# Patient Record
Sex: MALE | Race: WHITE | URBAN - METROPOLITAN AREA
[De-identification: names, ages, dates, MRNs, and addresses within clinical notes are randomized per-mention and may not be internally consistent; named-entity substitution may affect disease eponyms.]

---

## 2022-05-12 ENCOUNTER — EMERGENCY VISIT (OUTPATIENT)
Dept: URBAN - METROPOLITAN AREA CLINIC 1 | Facility: CLINIC | Age: 22
End: 2022-05-12

## 2022-05-12 DIAGNOSIS — S05.02XA: ICD-10-CM

## 2022-05-12 DIAGNOSIS — H20.00: ICD-10-CM

## 2022-05-12 PROCEDURE — 92012 INTRM OPH EXAM EST PATIENT: CPT

## 2022-05-12 ASSESSMENT — TONOMETRY
OD_IOP_MMHG: 16
OS_IOP_MMHG: 16

## 2022-05-12 ASSESSMENT — VISUAL ACUITY
OD_SC: J1+
OS_SC: J1+
OD_SC: 20/20
OS_SC: 20/20
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
## 2022-05-19 ENCOUNTER — FOLLOW UP (OUTPATIENT)
Dept: URBAN - METROPOLITAN AREA CLINIC 1 | Facility: CLINIC | Age: 22
End: 2022-05-19

## 2022-05-19 DIAGNOSIS — H20.00: ICD-10-CM

## 2022-05-19 DIAGNOSIS — S05.02XA: ICD-10-CM

## 2022-05-19 PROCEDURE — 92012 INTRM OPH EXAM EST PATIENT: CPT

## 2022-05-19 ASSESSMENT — VISUAL ACUITY
OD_SC: 20/20
OS_SC: 20/20

## 2022-05-19 ASSESSMENT — TONOMETRY
OS_IOP_MMHG: 19
OD_IOP_MMHG: 17
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
